# Patient Record
Sex: MALE | Race: AMERICAN INDIAN OR ALASKA NATIVE
[De-identification: names, ages, dates, MRNs, and addresses within clinical notes are randomized per-mention and may not be internally consistent; named-entity substitution may affect disease eponyms.]

---

## 2022-08-21 ENCOUNTER — HOSPITAL ENCOUNTER (EMERGENCY)
Dept: HOSPITAL 5 - ED | Age: 22
LOS: 1 days | Discharge: HOME | End: 2022-08-22
Payer: SELF-PAY

## 2022-08-21 DIAGNOSIS — J20.8: ICD-10-CM

## 2022-08-21 DIAGNOSIS — U07.1: ICD-10-CM

## 2022-08-21 DIAGNOSIS — J06.9: Primary | ICD-10-CM

## 2022-08-21 DIAGNOSIS — J12.81: ICD-10-CM

## 2022-08-21 DIAGNOSIS — J45.909: ICD-10-CM

## 2022-08-21 PROCEDURE — 71046 X-RAY EXAM CHEST 2 VIEWS: CPT

## 2022-08-21 PROCEDURE — 87116 MYCOBACTERIA CULTURE: CPT

## 2022-08-21 PROCEDURE — 87430 STREP A AG IA: CPT

## 2022-08-21 PROCEDURE — 99284 EMERGENCY DEPT VISIT MOD MDM: CPT

## 2022-08-21 NOTE — EMERGENCY DEPARTMENT REPORT
ED General Adult HPI





- General


Chief complaint: Fever


Stated complaint: BODY AND HEAD PAIN


Source: patient


Mode of arrival: Ambulatory


Limitations: No Limitations





- History of Present Illness


Initial comments: 





Patient is a 22-year-old male with a history of asthma who presents to the ED 

with complaint of acute onset persistent diffuse body aches and pains, 

intermittent fever and chills, nasal and sinus congestion, lack of appetite and 

generalized weakness and fatigue for the last 2 days.  Patient states that the 

symptoms got worse in the last 12 hours.  Patient states that no one else at 

home is had similar symptoms.  Patient denies dizziness, syncope, nausea and 

vomiting, chest pain or shortness of breath, abdominal pain, dysuria, urinary 

frequency and urgency, testicular pain or change in vision.


MD Complaint: fever, chills, cough, body aches and pains, nasal and sinus 

congestion


-: Gradual, days(s) (2)


Location: head, face


Radiation: non-radiation


Severity scale (0 -10): 10


Quality: aching, sharp


Consistency: constant


Improves with: none


Worsens with: none


Associated Symptoms: denies other symptoms, cough, fever/chills, headaches, loss

of appetite, malaise, weakness.  denies: confusion, chest pain, diaphoresis, 

nausea/vomiting, rash, seizure, shortness of breath, syncope, other


Treatments Prior to Arrival: none





- Related Data


                                  Previous Rx's











 Medication  Instructions  Recorded  Last Taken  Type


 


Acetaminophen [Tylenol] 500 mg PO Q6HR PRN #30 tablet 22 Unknown Rx


 


Ascorbic Acid [Vitamin C] 1,000 mg PO Q12H #30 tab 22 Unknown Rx


 


Brompheniramine/Pseudoephed/Dm 5 ml PO Q6H PRN #120 ml 22 Unknown Rx





[Bromfed Dm Cough Syrup]    


 


Cetirizine HCl [Zyrtec 10mg tab] 10 mg PO DAILY #30 tab 22 Unknown Rx


 


Ibuprofen [Motrin] 600 mg PO Q8H PRN #30 tablet 22 Unknown Rx











                                    Allergies











Allergy/AdvReac Type Severity Reaction Status Date / Time


 


No Known Allergies Allergy   Verified 22 17:48














ED Review of Systems


ROS: 


Stated complaint: BODY AND HEAD PAIN


Other details as noted in HPI





Constitutional: chills, fever, malaise, weakness


Eyes: denies: eye pain, eye discharge, vision change


ENT: throat pain, congestion.  denies: ear pain


Respiratory: cough.  denies: shortness of breath, wheezing


Cardiovascular: denies: chest pain, palpitations


Endocrine: no symptoms reported


Gastrointestinal: denies: abdominal pain, nausea, vomiting, diarrhea


Genitourinary: denies: urgency, dysuria


Musculoskeletal: arthralgia, myalgia.  denies: back pain, joint swelling


Skin: denies: rash, lesions


Neurological: headache.  denies: weakness, paresthesias


Psychiatric: denies: anxiety, depression


Hematological/Lymphatic: denies: easy bleeding, easy bruising





ED Past Medical Hx





- Past Medical History


Hx Asthma: Yes


Additional medical history: high cholesterol





- Surgical History


Past Surgical History?: No





- Social History


Smoking Status: Never Smoker





- Medications


Home Medications: 


                                Home Medications











 Medication  Instructions  Recorded  Confirmed  Last Taken  Type


 


Acetaminophen [Tylenol] 500 mg PO Q6HR PRN #30 tablet 22  Unknown Rx


 


Ascorbic Acid [Vitamin C] 1,000 mg PO Q12H #30 tab 22  Unknown Rx


 


Brompheniramine/Pseudoephed/Dm 5 ml PO Q6H PRN #120 ml 22  Unknown Rx





[Bromfed Dm Cough Syrup]     


 


Cetirizine HCl [Zyrtec 10mg tab] 10 mg PO DAILY #30 tab 22  Unknown Rx


 


Ibuprofen [Motrin] 600 mg PO Q8H PRN #30 tablet 22  Unknown Rx














ED Physical Exam





- General


Limitations: No Limitations


General appearance: alert, in no apparent distress





- Head


Head exam: Present: atraumatic, normocephalic, normal inspection





- Eye


Eye exam: Present: normal appearance, PERRL, EOMI


Pupils: Present: normal accommodation





- ENT


ENT exam: Present: mucous membranes moist, TM's normal bilaterally, normal 

external ear exam, other (Grossly congested nasal passages)





- Neck


Neck exam: Present: normal inspection, full ROM.  Absent: tenderness





- Respiratory


Respiratory exam: Present: normal lung sounds bilaterally.  Absent: respiratory 

distress, wheezes, rales, rhonchi, chest wall tenderness, accessory muscle use, 

decreased breath sounds, prolonged expiratory





- Cardiovascular


Cardiovascular Exam: Present: normal rhythm, tachycardia, normal heart sounds.  

Absent: systolic murmur, diastolic murmur, rubs, gallop





- GI/Abdominal


GI/Abdominal exam: Present: soft, normal bowel sounds.  Absent: tenderness, 

guarding, rebound, rigid, hyperactive bowel sounds, hypoactive bowel sounds, 

organomegaly, bruit





- Extremities Exam


Extremities exam: Present: normal inspection, full ROM, normal capillary refill.

  Absent: tenderness





- Back Exam


Back exam: Present: normal inspection, full ROM.  Absent: tenderness, CVA 

tenderness (R), CVA tenderness (L), muscle spasm, paraspinal tenderness, 

vertebral tenderness





- Neurological Exam


Neurological exam: Present: alert, oriented X3, CN II-XII intact, normal gait, 

reflexes normal





- Psychiatric


Psychiatric exam: Present: normal affect, normal mood





- Skin


Skin exam: Present: warm, dry, intact, normal color.  Absent: rash





ED Course


                                   Vital Signs











  22





  17:43


 


Temperature 101.5 F H


 


Pulse Rate 110 H


 


Respiratory 18





Rate 


 


Blood Pressure 120/76


 


O2 Sat by Pulse 100





Oximetry 














ED Medical Decision Making





- Radiology Data


Radiology results: report reviewed, image reviewed





[address] [address]  


 


                                            XRay Report   


                                               Signed  


 


Patient: NATALIE HERRERA                                                           

     MR#: [id number]  


 


: 2000                                                                

Acct:[id number]      


 


Age/Sex: 22 / M                                                                

ADM Date: 22     


 


Loc: ED       


Attending Dr:   


 


 


Ordering Physician: JT SHANE  


Date of Service: 22  


Procedure(s): XR chest routine 2V  


Accession Number(s): [id number]  


 


cc: JT SHANE   


 


Fluoro Time In Minutes:   


 


CHEST 2 VIEWS   


 


 INDICATION / CLINICAL INFORMATION: fever and chills.  


 


 COMPARISON: None available.  


 


 FINDINGS:  


 


 SUPPORT DEVICES: None.  


 HEART / MEDIASTINUM: No significant abnormality.   


 LUNGS / PLEURA: No significant pulmonary or pleural abnormality. No 

pneumothorax.   


 


 ADDITIONAL FINDINGS: No significant additional findings.  


 


 IMPRESSION:  


 1. No acute findings.  


 


 Signer Name: Ashutosh Ramires MD   


 Signed: 2022 9:05 PM  


 Workstation Name: [id number]   


 


 


Transcribed By: JEREMIAH  


Dictated By: ASHUTOSH RAMIRES MD  


Electronically Authenticated By: ASHUTOSH RAMIRES MD    


Signed Date/Time: 22                                


 


 


 


DD/DT: 22                                                            

  


TD/TT:








- Medical Decision Making





This s a 22-year-old male with a history of asthma who presents to the ED with 

complaint of acute onset persistent diffuse body aches and pains, intermittent 

fever and chills, nasal and sinus congestion, lack of appetite and generalized 

weakness and fatigue for the last 2 days.  Patient states that the symptoms got 

worse in the last 12 hours.  Patient states that no one else at home is had 

similar symptoms.  In the ED, patient is alert and oriented x3 and is not in any

 distress.  Patient however is tachycardic and febrile in triage.  Patient was 

treated for fever and pain in the ED.  Chest x-ray showed no acute cardiop

ulmonary abnormalities or pneumonitis.  Rapid influenza and rapid strep test 

were negative.  On reevaluation, patient's fever resolved, patient tachycardia 

also resolved and patient felt better.  Patient was discharged home on 

medications and advised to ensure that he gets tested for COVID-19 viral 

infection at any of the outpatient facilities.  Patient was advised to self 

quarantine at home for 5 days if he test positive for COVID-19.  Patient was 

also advised to return to the ED immediately if symptoms get worse.  Patient was

 otherwise advised to follow-up with his primary care physician in 7 to 10 days 

for reevaluation.





- Differential Diagnosis


Influenza; COVID-19; URI; bronchitis; pneumonia; strep


Critical care attestation.: 


If time is entered above; I have spent that time in minutes in the direct care 

of this critically ill patient, excluding procedure time.








ED Disposition


Clinical Impression: 


 Acute upper respiratory infection, Suspected severe acute respiratory syndrome 

coronavirus 2 (SARS-CoV-2) infection





Acute bronchitis


Qualifiers:


 Bronchitis organism: other organism Qualified Code(s): J20.8 - Acute bronchitis

 due to other specified organisms





Disposition: 01 HOME / SELF CARE / HOMELESS


Is pt being admited?: No


Does the pt Need Aspirin: No


Condition: Stable


Instructions:  Cough, Adult, Easy-to-Read, Acute Bronchitis, Adult, 

Easy-to-Read, Upper Respiratory Infection, Adult, Easy-to-Read, Acute Bronchitis

 (ED)


Additional Instructions: 


Chest x-ray showed no acute cardiopulmonary abnormalities or pneumonitis.  

Therefore take medication as advised, get tested for COVID-19 viral infection in

 any of the outpatient facilities and if possible self quarantine at home for 5 

days while taking medications.  Follow-up with your primary care physician in 7 

to 10 days for reevaluation.  Return to the ED immediately if symptoms get 

worse.


Prescriptions: 


Acetaminophen [Tylenol] 500 mg PO Q6HR PRN #30 tablet


 PRN Reason: Pain or fever


Brompheniramine/Pseudoephed/Dm [Bromfed Dm Cough Syrup] 5 ml PO Q6H PRN #120 ml


 PRN Reason: Cough


Ibuprofen [Motrin] 600 mg PO Q8H PRN #30 tablet


 PRN Reason: Pain


Ascorbic Acid [Vitamin C] 1,000 mg PO Q12H #30 tab


Cetirizine HCl [Zyrtec 10mg tab] 10 mg PO DAILY #30 tab


Referrals: 


BÁRBARA GAFFNEY MD [Primary Care Provider] - 7-10 days


Forms:  Work/School Release Form(ED)


Time of Disposition: 21:38


Print Language: ENGLISH

## 2022-08-22 VITALS — SYSTOLIC BLOOD PRESSURE: 112 MMHG | DIASTOLIC BLOOD PRESSURE: 77 MMHG
